# Patient Record
Sex: FEMALE
[De-identification: names, ages, dates, MRNs, and addresses within clinical notes are randomized per-mention and may not be internally consistent; named-entity substitution may affect disease eponyms.]

---

## 2022-09-19 ENCOUNTER — NON-APPOINTMENT (OUTPATIENT)
Age: 42
End: 2022-09-19

## 2022-09-21 PROBLEM — Z00.00 ENCOUNTER FOR PREVENTIVE HEALTH EXAMINATION: Status: ACTIVE | Noted: 2022-09-21

## 2022-10-23 DIAGNOSIS — M25.561 PAIN IN RIGHT KNEE: ICD-10-CM

## 2022-10-23 DIAGNOSIS — M25.562 PAIN IN RIGHT KNEE: ICD-10-CM

## 2022-10-24 ENCOUNTER — APPOINTMENT (OUTPATIENT)
Dept: ORTHOPEDIC SURGERY | Facility: CLINIC | Age: 42
End: 2022-10-24

## 2022-10-24 ENCOUNTER — TRANSCRIPTION ENCOUNTER (OUTPATIENT)
Age: 42
End: 2022-10-24

## 2022-10-24 ENCOUNTER — NON-APPOINTMENT (OUTPATIENT)
Age: 42
End: 2022-10-24

## 2022-10-24 VITALS — WEIGHT: 184 LBS | HEIGHT: 62.5 IN | BODY MASS INDEX: 33.01 KG/M2

## 2022-10-24 DIAGNOSIS — M17.0 BILATERAL PRIMARY OSTEOARTHRITIS OF KNEE: ICD-10-CM

## 2022-10-24 DIAGNOSIS — E11.9 TYPE 2 DIABETES MELLITUS W/OUT COMPLICATIONS: ICD-10-CM

## 2022-10-24 PROCEDURE — 73564 X-RAY EXAM KNEE 4 OR MORE: CPT | Mod: 50

## 2022-10-24 PROCEDURE — 99204 OFFICE O/P NEW MOD 45 MIN: CPT

## 2022-10-25 NOTE — DISCUSSION/SUMMARY
[de-identified] : Discussed at length the natural history of bilateral knee degenerative arthritis specially on patella joint, worse on the LEFT and reviewed non-operative and operative treatment. Due to the pain, failure of prior nonoperative treatment including injections, NSAIDs, and physiotherapy, and associated disability I recommend staged bilateral total knee replacement, the LEFT side would be done first.The risks, benefits, convalescence and alternatives were reviewed. Numerous questions were asked and answered. Models were used as an educational tool. We did discuss implant choice and fixation, with shared decision making with the patient. Surgery will be scheduled at a convenient time. Preop medical clearance.We did discuss their young age, level of post-op activity, mechanisms of failure and longevity of implants. We discussed doing the right side in the future. \par \par This was all discussed with  who was also present.

## 2022-10-25 NOTE — HISTORY OF PRESENT ILLNESS
[de-identified] : VAHID HORAN is a 42 year year old female presents for initial evaluation of  bilateral  knee pain, L> R. She reports that symptoms have been going on for many years. [facility] has [facility]. She did have a right knee scope 18 years ago. She has also had multiple cortisone  injections  1-2 series of Orthovisc and most recently in June had a monovisc that helped the right knee however develop swelling of the left knee. He then had an aspiration and cortisone injection of the left knee. She has been using a stationary bike. She has done physical therapy in the past and has tried multiple medications including Mobic, Advil and Tylenol. She describes the pain to be over the patella and lateral in aspect. The pain is sharp in nature with swelling, buckling, locking and stiffness. She has pain after walking long distances and she states that stairs are difficult to use.  She would like to discuss surgery for total knee replacement . \par \par PMH:Pre diabetes,GERD, anxiety and depression. Also had cholecystectomy and seasonal allergies\par arhtroscopy and minuscus repair on right knee \par

## 2022-10-25 NOTE — PHYSICAL EXAM
[de-identified] : General appearance: well nourished and hydrated, pleasant, alert and oriented x 3, cooperative.\par HEENT: Normocephalic, EOM intact, Nasal septum midline, Oral cavity clear, External auditory canal clear.\par Cardiovascular: no apparent abnormalities, no lower leg edema, no varicosities, pedal pulses are palpable.\par Lymphatics Lymph nodes: none palpated, Lymphedema: not present.\par Neurologic: sensation is normal, no muscle weakness in upper or lower extremities, patella tendon reflexes intact .\par Dermatologic no apparent skin lesions, moist, warm, no rash.\par Spine:cervical spine appears normal and moves freely, thoracic spine appears normal and moves freely, lumbosacral spine appears normal and moves freely.\par Gait: nonantalgic.\par \par Left knee\par Inspection: no effusion or erythema.\par Wounds: none.\par Alignment: normal.\par Palpation: lateral  tenderness on palpation.\par ROM active (in degrees):0-125 with crepitus \par Ligamentous laxity: all ligaments appear stable,, negative ant. drawer test, negative post. drawer test, stable to varus stress test, stable to valgus stress test. negative Lachman's test, negative pivot shift test\par Meniscal Test: negative McMurrays, negative Richa.\par Patellofemoral Alignment Test: Q angle-, normal.\par Muscle Test: good quad strength.\par Leg examination: calf is soft and non-tender.\par \par Right knee\par Inspection: no effusion or erythema.\par Wounds: none.\par Alignment: normal.\par Palpation: medial tenderness on palpation.\par ROM active (in degrees): 0-125 with crepitus, good strength \par Ligamentous laxity: all ligaments appear stable,, negative ant. drawer test, negative post. drawer test, stable to varus stress test, stable to valgus stress test. negative Lachman's test, negative pivot shift test\par Meniscal Test: negative McMurrays, negative Richa.\par Patellofemoral Alignment Test: Q angle-, normal.\par Muscle Test: good quad strength.\par Leg examination: calf is soft and non-tender.\par \par Left hip\par Inspection: No swelling or ecchymosis.\par Wounds: none.\par Palpation: non-tender.\par Stability: no instability.\par Strength: 5/5 all motor groups.\par ROM: no pain with FROM.\par Leg length: equal.\par \par Right hip\par Inspection: No swelling or ecchymosis.\par Wounds: none.\par Palpation: non-tender.\par Stability: no instability.\par Strength: 5/5 all motor groups.\par ROM: no pain with FROM.\par Leg length: equal.\par \par Left ankle\par Inspection: no erythema noted, no swelling noted.\par Palpation: no pain on palpation .\par ROM: FROM without crepitus.\par Muscle strength: 5/5.\par Stability: no instability noted.\par \par Right ankle\par Inspection: no erythema noted, no swelling noted.\par ROM: FROM without crepitus.\par Palpation: no pain on palpation .\par Muscle strength: 5/5.\par Stability: no instability noted.\par \par Left foot\par Inspection: color, texture and turgor are normal.\par ROM: full range of motion of all joints without pain or crepitus.\par Palpation: no tenderness.\par Stability: no instability noted.\par \par Right foot\par Inspection: color, texture and turgor are normal.\par ROM: full range of motion of all joints without pain or crepitus.\par Palpation: no tenderness.\par Stability: no instability noted.\par 	  [de-identified] : Right knee xrays, standing AP/Lateral and Merchant films, and 45 degree PA standing view, taken at the office today shows diffuse tricompartmental degenerative arthritis, medial joint space narrowing, marginal osteophytes, sclerosis, patellofemoral joint space narrowing, peripheral osteophytes, Kellgren and Gold grade 3\par \par Left knee xrays, standing AP/Lateral and Merchant films, and 45 degree PA standing view, taken at the office today shows diffuse tricompartmental degenerative arthritis, medial joint space narrowing, marginal osteophytes, sclerosis, patellofemoral joint space narrowing, peripheral osteophytes, Kellgren and Gold grade 3

## 2022-11-08 ENCOUNTER — APPOINTMENT (OUTPATIENT)
Dept: ORTHOPEDIC SURGERY | Facility: HOSPITAL | Age: 42
End: 2022-11-08

## 2022-11-28 ENCOUNTER — APPOINTMENT (OUTPATIENT)
Dept: ORTHOPEDIC SURGERY | Facility: CLINIC | Age: 42
End: 2022-11-28